# Patient Record
Sex: FEMALE | URBAN - METROPOLITAN AREA
[De-identification: names, ages, dates, MRNs, and addresses within clinical notes are randomized per-mention and may not be internally consistent; named-entity substitution may affect disease eponyms.]

---

## 2023-08-14 ENCOUNTER — EMERGENCY (EMERGENCY)
Facility: HOSPITAL | Age: 18
LOS: 1 days | Discharge: HOME | End: 2023-08-14
Attending: EMERGENCY MEDICINE | Admitting: EMERGENCY MEDICINE
Payer: COMMERCIAL

## 2023-08-14 VITALS
WEIGHT: 199.96 LBS | OXYGEN SATURATION: 97 % | SYSTOLIC BLOOD PRESSURE: 103 MMHG | DIASTOLIC BLOOD PRESSURE: 67 MMHG | TEMPERATURE: 98 F | HEART RATE: 126 BPM | RESPIRATION RATE: 18 BRPM

## 2023-08-14 DIAGNOSIS — F12.929 CANNABIS USE, UNSPECIFIED WITH INTOXICATION, UNSPECIFIED: ICD-10-CM

## 2023-08-14 PROCEDURE — 99283 EMERGENCY DEPT VISIT LOW MDM: CPT

## 2023-08-14 NOTE — ED PROVIDER NOTE - PATIENT PORTAL LINK FT
You can access the FollowMyHealth Patient Portal offered by [facility] by registering at the following website: [email or web address]. By joining [email or web address]’s FollowMyHealth portal, you will also be able to view your health information using other applications (apps) compatible with our system.

## 2023-08-14 NOTE — ED PROVIDER NOTE - NSFOLLOWUPINSTRUCTIONS_ED_ALL_ED_FT
Preventing Marijuana Misuse  Marijuana is a mixture of the dried leaves and flowers of the hemp plant Cannabis sativa. The plant's active ingredients (cannabinoids) change the chemistry of the brain. If you smoke or eat marijuana, you will experience changes in the way you think, feel, and behave.    What is marijuana used for?  In some cases, marijuana is prescribed by a health care provider (medical marijuana) for temporary relief from a medical condition. It can have medical effects, such as:  Reduced nausea.  Increased appetite.  Reduced muscle spasm.  Pain relief.  Anxiety relief.  Many people use marijuana for recreational purposes because it helps them relax and puts them in a pleasurable mood (marijuana high).    How can marijuana use affect me?  Marijuana affects you both mentally and physically. Using marijuana can make you feel high and relaxed. It can also have negative effects, both short term and long term. When used for recreational purposes, marijuana is often used in higher doses and for longer periods. High doses of marijuana can cause unpleasant side effects. It is also possible to become addicted to this drug.    Short-term effects of marijuana use include:  Changes in mood and perception, such as an altered sense of time.  Increased heart rate.  Increased appetite.  Slowed movement, coordination, and reaction time.  Poor memory, judgment, and problem-solving ability.  Drowsiness.  Bloodshot eyes and changes in vision.  Coughing.  High doses of marijuana can cause:  Panic.  Anxiety.  Mental confusion.  Severe dizziness.  Vomiting.  Hallucinations. This means you see, hear, taste, smell, or feel things that are not real.  Coma.  What can happen if I keep using marijuana?  If you use marijuana for a long time, it can have long-term effects such as:  Higher risk of health problems, such as:  Lung and breathing problems.  Possible higher risk of heart and cardiovascular problems or testicular cancer.  Mental and physical dependence (addiction).  Slowed brain development in young people. Babies whose mothers used marijuana during pregnancy may have an increased risk of problems with brain development and behavior.  Hallucinations or temporary periods of false perceptions or beliefs (paranoia).  Worsening of mental illness or onset of new mental illness. This can include anxiety, depression, or suicidal thoughts.  Difficulty maintaining healthy relationships.  Poor memory and difficulty concentrating and learning. This can result in decreased intelligence, poor performance at school or work, and an increased risk of dropping out of school.  Higher risk of using other substances like alcohol, nicotine, and illegal drugs.  A condition called cannabinoid hyperemesis syndrome. This causes repeated episodes of intense abdominal pain, nausea, and vomiting.  Quitting marijuana after using it for a long time can cause withdrawal symptoms, such as:  Headache.  Shakiness.  Cravings for the drug.  Sweating.  Stomach pain, nausea, and vomiting.  Restlessness and trouble sleeping.  Anxiety, irritability, and anger.  Decreased appetite.  What are the benefits of not using marijuana?  Not using marijuana can keep you from becoming dependent on it. You can avoid the drug's negative effects on your quality of life. You can avoid accidents caused by the slowed reaction time and decreased thinking ability that are common with marijuana use and abuse. You can also prevent the long-term effects that this drug can cause.    What actions can I take to stop using marijuana?    If you are not physically or mentally dependent on marijuana, you should be able to stop using it on your own. Taking these actions may help:  Find healthy ways to cope with stress, such as exercise, meditation, or spending time with family and friends. Talk with your health care provider about how you feel and how to cope with stress.  Spend time with people who do not use marijuana, or make new friends who do not use marijuana.  Do something else instead of using marijuana. You can exercise, take up a hobby, or participate in activities that you can do with others.  Do not be afraid to say no if someone offers you marijuana. Speak up about why you do not want to use drugs. You can be a positive role model for others.  If you cannot stop on your own, ask your health care provider for help. Treatment for marijuana addiction is similar to treatment for other addictions. It may include:  Cognitive-behavioral therapy (psychotherapy). This may include individual or group therapy.  Joining a support group.  Treating medical, behavioral, or mental health conditions that exist along with marijuana dependency.  Where to find more information  Learn more about:  Marijuana from the [place] National [place] on Drug Abuse: www.drugabuse.gov  Medical marijuana from the National Institutes of Health: nccih.nih.gov  Treatment options from the Substance Abuse and Mental Health Services Administration: samhsa.gov  Recovery from marijuana dependency from [employer]org: www.recovery.org  Contact a health care provider if:  You want to stop using marijuana but you cannot.  You have withdrawal symptoms when you try to stop using marijuana.  You are using marijuana every day.  You need to use increasing amounts of marijuana to get the same desired effect.  You are using marijuana along with other drugs like cocaine or alcohol.  You have anxiety or depression.  You have hallucinations or paranoia.  Marijuana use is interfering with your relationships or your ability to function normally at school or at work.  Get help right away if:  You develop severe chest pain, dizziness, or shortness of breath.  You have severe abdominal pain, nausea, and vomiting.  Summary  Using marijuana can make you feel high and relaxed, and if used properly, it can have some medical benefits. However, it can also have negative effects, both short term and long term.  High doses of marijuana can cause unpleasant side effects. These can be both physical and mental.  Marijuana has the potential to cause physical dependence and even addiction.  Long-term use may interfere with your ability to function normally at home, school, or work. It can sometimes lead to using other substances like alcohol, nicotine, and illegal drugs.  If you need help to stop using marijuana, ask your health care provider. Marijuana addiction can be treated.

## 2023-08-14 NOTE — ED PROVIDER NOTE - CLINICAL SUMMARY MEDICAL DECISION MAKING FREE TEXT BOX
clinically sober at time of discharge w/no active medical complaints, ambulatory w/steady gait, VSS. given return precautions.

## 2023-08-14 NOTE — ED PROVIDER NOTE - OBJECTIVE STATEMENT
19 y/o F Pt BIB by EMS for THC intoxication.  Admits to heavy THC edible use today, no other complaints.  Placed in stretcher and quickly falls asleep.  Unable to cooperate with remainder of history. + covered in vomit, not currently endorsing nausea/poor historian.

## 2023-08-14 NOTE — ED ADULT NURSE REASSESSMENT NOTE - NS ED NURSE REASSESS COMMENT FT1
received patient from previous RN. patient resting comfortably in stretcher, no signs of distress noted.

## 2023-08-14 NOTE — ED ADULT NURSE NOTE - OBJECTIVE STATEMENT
as above friend in attendance who took same gummies but standing steady, conversant , nil pmhx, nil regular meds

## 2023-08-14 NOTE — ED ADULT NURSE NOTE - NSFALLRISKINTERV_ED_ALL_ED
Assistance OOB with selected safe patient handling equipment if applicable/Communicate fall risk and risk factors to all staff, patient, and family/Monitor for mental status changes and reorient to person, place, and time, as needed/Move patient closer to nursing station/within visual sight of ED staff/Provide visual cue: yellow wristband, yellow gown, etc/Reinforce activity limits and safety measures with patient and family/Toileting schedule using arm’s reach rule for commode and bathroom/Use of alarms - bed, stretcher, chair and/or video monitoring/Call bell, personal items and telephone in reach/Instruct patient to call for assistance before getting out of bed/chair/stretcher/Non-slip footwear applied when patient is off stretcher/[place] to call system/Physically safe environment - no spills, clutter or unnecessary equipment/Purposeful Proactive Rounding/Room/bathroom lighting operational, light cord in reach

## 2023-08-14 NOTE — ED ADULT TRIAGE NOTE - CHIEF COMPLAINT QUOTE
Pt presents to ED for nausea, vomiting and drowsiness after eating 3 THC-A, THC-P and Delta 8 edible gummies.

## 2023-08-14 NOTE — ED PROVIDER NOTE - PHYSICAL EXAMINATION
General: lethargic, arousable to touch, NAD  Head: NCAT  Eyes: PERRL  Heart: RRR  Lungs: CTAB  Abd: soft, NTND  Neuro: moves all 4 extremities equally  Skin: no e/o lacerations, abrasions, or ecchymoses